# Patient Record
Sex: MALE | Race: WHITE | NOT HISPANIC OR LATINO | Employment: OTHER | URBAN - NONMETROPOLITAN AREA
[De-identification: names, ages, dates, MRNs, and addresses within clinical notes are randomized per-mention and may not be internally consistent; named-entity substitution may affect disease eponyms.]

---

## 2021-04-01 ENCOUNTER — TRANSCRIBE ORDERS (OUTPATIENT)
Dept: ADMINISTRATIVE | Facility: HOSPITAL | Age: 71
End: 2021-04-01

## 2021-04-01 DIAGNOSIS — R41.82 ALTERED MENTAL STATUS, UNSPECIFIED ALTERED MENTAL STATUS TYPE: Primary | ICD-10-CM

## 2021-04-08 ENCOUNTER — TRANSCRIBE ORDERS (OUTPATIENT)
Dept: ADMINISTRATIVE | Facility: HOSPITAL | Age: 71
End: 2021-04-08

## 2021-04-21 ENCOUNTER — HOSPITAL ENCOUNTER (OUTPATIENT)
Dept: CT IMAGING | Facility: HOSPITAL | Age: 71
Discharge: HOME | End: 2021-04-21
Admitting: PHYSICIAN ASSISTANT

## 2021-04-21 DIAGNOSIS — R41.82 ALTERED MENTAL STATUS, UNSPECIFIED ALTERED MENTAL STATUS TYPE: ICD-10-CM

## 2021-04-21 PROCEDURE — 70450 CT HEAD/BRAIN W/O DYE: CPT

## 2021-04-21 PROCEDURE — 70450 CT HEAD/BRAIN W/O DYE: CPT | Performed by: RADIOLOGY

## 2021-05-14 ENCOUNTER — TRANSCRIBE ORDERS (OUTPATIENT)
Dept: ADMINISTRATIVE | Facility: HOSPITAL | Age: 71
End: 2021-05-14

## 2021-05-14 DIAGNOSIS — R10.84 GENERALIZED ABDOMINAL PAIN: Primary | ICD-10-CM

## 2021-05-26 ENCOUNTER — HOSPITAL ENCOUNTER (OUTPATIENT)
Dept: CT IMAGING | Facility: HOSPITAL | Age: 71
Discharge: HOME | End: 2021-05-26
Admitting: PHYSICIAN ASSISTANT

## 2021-05-26 DIAGNOSIS — R10.84 GENERALIZED ABDOMINAL PAIN: ICD-10-CM

## 2021-05-26 PROCEDURE — 74176 CT ABD & PELVIS W/O CONTRAST: CPT

## 2021-05-26 PROCEDURE — 74176 CT ABD & PELVIS W/O CONTRAST: CPT | Performed by: RADIOLOGY

## 2022-02-10 ENCOUNTER — TRANSCRIBE ORDERS (OUTPATIENT)
Dept: ADMINISTRATIVE | Facility: HOSPITAL | Age: 72
End: 2022-02-10

## 2022-02-10 DIAGNOSIS — R07.9 CHEST PAIN, UNSPECIFIED TYPE: Primary | ICD-10-CM

## 2023-11-29 ENCOUNTER — SPECIALTY PHARMACY (OUTPATIENT)
Dept: PHARMACY | Facility: HOSPITAL | Age: 73
End: 2023-11-29
Payer: MEDICARE

## 2023-11-29 ENCOUNTER — OFFICE VISIT (OUTPATIENT)
Dept: ENDOCRINOLOGY | Facility: CLINIC | Age: 73
End: 2023-11-29
Payer: MEDICARE

## 2023-11-29 VITALS
OXYGEN SATURATION: 98 % | BODY MASS INDEX: 36.73 KG/M2 | DIASTOLIC BLOOD PRESSURE: 84 MMHG | HEART RATE: 74 BPM | HEIGHT: 69 IN | SYSTOLIC BLOOD PRESSURE: 165 MMHG | WEIGHT: 248 LBS

## 2023-11-29 DIAGNOSIS — E83.52 HYPERCALCEMIA: ICD-10-CM

## 2023-11-29 DIAGNOSIS — E11.65 TYPE 2 DIABETES MELLITUS WITH HYPERGLYCEMIA, WITH LONG-TERM CURRENT USE OF INSULIN: Primary | ICD-10-CM

## 2023-11-29 DIAGNOSIS — Z79.4 TYPE 2 DIABETES MELLITUS WITH HYPERGLYCEMIA, WITH LONG-TERM CURRENT USE OF INSULIN: Primary | ICD-10-CM

## 2023-11-29 LAB
25(OH)D3 SERPL-MCNC: 18 NG/ML (ref 30–100)
ALBUMIN SERPL-MCNC: 3.8 G/DL (ref 3.5–5.2)
ALBUMIN UR-MCNC: >440 MG/DL
ALBUMIN/GLOB SERPL: 1.3 G/DL
ALP SERPL-CCNC: 77 U/L (ref 39–117)
ALT SERPL W P-5'-P-CCNC: 13 U/L (ref 1–41)
ANION GAP SERPL CALCULATED.3IONS-SCNC: 9.4 MMOL/L (ref 5–15)
AST SERPL-CCNC: 19 U/L (ref 1–40)
BILIRUB SERPL-MCNC: 0.4 MG/DL (ref 0–1.2)
BUN SERPL-MCNC: 20 MG/DL (ref 8–23)
BUN/CREAT SERPL: 14.8 (ref 7–25)
CA-I SERPL ISE-MCNC: 1.23 MMOL/L (ref 1.12–1.32)
CALCIUM SPEC-SCNC: 9 MG/DL (ref 8.6–10.5)
CHLORIDE SERPL-SCNC: 103 MMOL/L (ref 98–107)
CHOLEST SERPL-MCNC: 165 MG/DL (ref 0–200)
CO2 SERPL-SCNC: 27.6 MMOL/L (ref 22–29)
CREAT SERPL-MCNC: 1.35 MG/DL (ref 0.76–1.27)
CREAT UR-MCNC: 78.4 MG/DL
EGFRCR SERPLBLD CKD-EPI 2021: 56.1 ML/MIN/1.73
GLOBULIN UR ELPH-MCNC: 2.9 GM/DL
GLUCOSE SERPL-MCNC: 295 MG/DL (ref 65–99)
HDLC SERPL-MCNC: 53 MG/DL (ref 40–60)
LDLC SERPL CALC-MCNC: 79 MG/DL (ref 0–100)
LDLC/HDLC SERPL: 1.37 {RATIO}
MICROALBUMIN/CREAT UR: NORMAL MG/G{CREAT}
POTASSIUM SERPL-SCNC: 4.1 MMOL/L (ref 3.5–5.2)
PROT SERPL-MCNC: 6.7 G/DL (ref 6–8.5)
PTH-INTACT SERPL-MCNC: 95.2 PG/ML (ref 15–65)
SODIUM SERPL-SCNC: 140 MMOL/L (ref 136–145)
T4 FREE SERPL-MCNC: 1.06 NG/DL (ref 0.93–1.7)
TRIGL SERPL-MCNC: 197 MG/DL (ref 0–150)
TSH SERPL DL<=0.05 MIU/L-ACNC: 3.22 UIU/ML (ref 0.27–4.2)
VLDLC SERPL-MCNC: 33 MG/DL (ref 5–40)

## 2023-11-29 PROCEDURE — 84443 ASSAY THYROID STIM HORMONE: CPT | Performed by: NURSE PRACTITIONER

## 2023-11-29 PROCEDURE — 83970 ASSAY OF PARATHORMONE: CPT | Performed by: NURSE PRACTITIONER

## 2023-11-29 PROCEDURE — 82330 ASSAY OF CALCIUM: CPT | Performed by: NURSE PRACTITIONER

## 2023-11-29 PROCEDURE — 84439 ASSAY OF FREE THYROXINE: CPT | Performed by: NURSE PRACTITIONER

## 2023-11-29 PROCEDURE — 82652 VIT D 1 25-DIHYDROXY: CPT | Performed by: NURSE PRACTITIONER

## 2023-11-29 PROCEDURE — 80061 LIPID PANEL: CPT | Performed by: NURSE PRACTITIONER

## 2023-11-29 PROCEDURE — 82306 VITAMIN D 25 HYDROXY: CPT | Performed by: NURSE PRACTITIONER

## 2023-11-29 PROCEDURE — 80053 COMPREHEN METABOLIC PANEL: CPT | Performed by: NURSE PRACTITIONER

## 2023-11-29 PROCEDURE — 82043 UR ALBUMIN QUANTITATIVE: CPT | Performed by: NURSE PRACTITIONER

## 2023-11-29 PROCEDURE — 82570 ASSAY OF URINE CREATININE: CPT | Performed by: NURSE PRACTITIONER

## 2023-11-29 RX ORDER — LISINOPRIL 20 MG/1
20 TABLET ORAL DAILY
COMMUNITY

## 2023-11-29 RX ORDER — DUTASTERIDE 0.5 MG/1
0.5 CAPSULE, LIQUID FILLED ORAL DAILY
COMMUNITY
Start: 2023-10-12

## 2023-11-29 RX ORDER — FLUTICASONE PROPIONATE 50 MCG
1 SPRAY, SUSPENSION (ML) NASAL DAILY
COMMUNITY
Start: 2023-09-29

## 2023-11-29 RX ORDER — PANTOPRAZOLE SODIUM 40 MG/1
40 TABLET, DELAYED RELEASE ORAL DAILY
COMMUNITY
Start: 2023-09-18

## 2023-11-29 RX ORDER — SITAGLIPTIN 100 MG/1
100 TABLET, FILM COATED ORAL DAILY
COMMUNITY
Start: 2023-10-11

## 2023-11-29 RX ORDER — INSULIN GLARGINE 100 [IU]/ML
5 INJECTION, SOLUTION SUBCUTANEOUS NIGHTLY
COMMUNITY
Start: 2023-11-01

## 2023-11-29 RX ORDER — ROSUVASTATIN CALCIUM 20 MG/1
20 TABLET, COATED ORAL DAILY
COMMUNITY
Start: 2023-09-29

## 2023-11-29 RX ORDER — GLIPIZIDE 10 MG/1
10 TABLET ORAL DAILY
COMMUNITY
Start: 2023-10-12

## 2023-11-29 NOTE — PROGRESS NOTES
Specialty Pharmacy Patient Management Program  Endocrinology Initial Assessment       Frankie Bustillo is a 73 y.o. male with {Endo Disease States:38166} seen by an Endocrinology provider and enrolled in the Endocrinology Patient Management program offered by [facility].  An initial outreach was conducted, including assessment of therapy appropriateness and specialty medication education for ***Medications {endomeds:85948}. The patient was introduced to services offered by [facility], including: regular assessments, refill coordination, curbside pick-up or mail order delivery options, prior authorization maintenance, and financial assistance programs as applicable. The patient was also provided with contact information for the pharmacy team.     Patient is currently taking ***    Patient checks BG *** with readings ***. Patient {Reports or denies:23984} low BG <70.     Patient {Reports or denies:29425} personal/family history of thyroid cancer, {Reports or denies:50741} history of pancreatitis, and {Reports or denies:57795} issues with recurrent UTI/yeast infections.     In the past, the patient has tried:     Drug Dose Reason for Discontinuation Notes                                                                 Insurance Coverage & Financial Support  ***     Relevant Past Medical History and Comorbidities  Relevant medical history and concomitant health conditions were discussed with the patient. The patient's chart has been reviewed for relevant past medical history and comorbid health conditions and updated as necessary.   No past medical history on file.  Social History     Socioeconomic History    Marital status:    Tobacco Use    Smoking status: Never     Passive exposure: Never    Smokeless tobacco: Never   Substance and Sexual Activity    Alcohol use: Never    Drug use: Never    Sexual activity: Defer            Allergies  Known allergies and reactions were  "discussed with the patient. The patient's chart has been reviewed for  allergy information and updated as necessary.   Not on File         Relevant Laboratory Values    No results found for: \"HGBA1C\"  No results found for: \"GLUCOSE\", \"CALCIUM\", \"NA\", \"K\", \"CO2\", \"CL\", \"BUN\", \"CREATININE\", \"EGFRIFAFRI\", \"EGFRIFNONA\", \"BCR\", \"ANIONGAP\"  No results found for: \"CHOL\", \"CHLPL\", \"TRIG\", \"HDL\", \"LDL\", \"LDLDIRECT\"      Current Medication List  This medication list has been reviewed with the patient and evaluated for any interactions or necessary modifications/recommendations, and updated to include all prescription medications, OTC medications, and supplements the patient is currently taking.  This list reflects what is contained in the patient's profile, which has also been marked as reviewed to communicate to other providers it is the most up to date version of the patient's current medication therapy.   No current outpatient medications on file.         Drug Interactions  No significant drug-drug interactions with diabetes medications expected according to literature.  *** Enter Interactions Here or \"None\"     Recommended Medications Assessment  Aspirin -  {reccommendedendomeds:06315}  Statin -  {reccommendedendomeds:99599}  ACEi/ARB - {reccommendedendomeds:62815}    Current 10-Year ASCVD Risk:     Initial Education Provided for Specialty Medication  The patient has been provided with the following education and any applicable administration techniques (i.e. self-injection) have been demonstrated for the therapies indicated. All questions and concerns have been addressed prior to the patient receiving the medication, and the patient has verbalized understanding of the education and any materials provided.  Additional patient education shall be provided and documented upon request by the patient, provider or payer.      *** Enter Specific Medication Dot Phrase Here    Adherence and Self-Administration  Adherence related " to the patient's specialty therapy was discussed with the patient. The Adherence segment of this outreach has been reviewed and updated.              Barriers to Patient Adherence and/or Self-Administration: ***   Methods for Supporting Patient Adherence and/or Self-Administration: ***    Vaccination Status:   COVID 19:   Influenza:   Pneumococcal:   Hep B:    Smoker?  ***    Goals of Therapy  Goals related to the patient's specialty therapy were discussed with the patient. The Patient Goals segment of this outreach has been reviewed and updated.    Goals Addressed Today    None         Reassessment Plan & Follow-Up  Patient's diabetes is {controlled vs. uncontrolled:79849} with A1C of ***%.  Medication Therapy Changes:  ***   Related Plans, Therapy Recommendations or Therapy Problems to Be Addressed: ***   Recommended appropriate vaccinations to patient - ***accepts/declines today   Patient ***reports/denies issues with affordability or adherence at this time.       Patient does not wish to use [facility] at this time due to:     Attestation  I attest the patient was actively involved in and has agreed to the above plan of care. If the prescribed therapy is at any point deemed not appropriate based on the current or future assessments, a consultation will be initiated with the patient's specialty care provider to determine the best course of action. The revised plan of therapy will be documented along with any required assessments and/or additional patient education provided..    Kym Lyle, PharmD, PATRICIA KENDALL  Clinical Specialty Pharmacist, Endocrinology  11/29/2023  10:21 EST

## 2023-11-29 NOTE — ASSESSMENT & PLAN NOTE
Discussed various underlying causes of hypercalcemia with patient and family.  Will obtain labs to determine underlying cause to determine treatment plan.  Follow-up in 3 months.

## 2023-11-29 NOTE — PROGRESS NOTES
Pt was referred for management of type 2 diabetes and hyperparathyroidism. He reports only wanting to be seen for his thyroid at this time. Reviewed medications and updated patient's medication list and drug allergies. No diabetes assessment was performed today.     Please disregard specialty pharmacy diabetes encounter.     Thank you,     Kym Lyle, PharmD, PATRICIA KENDALL  Clinical Specialty Pharmacist, Endocrinology  11/29/23  10:33 EST

## 2023-11-29 NOTE — PROGRESS NOTES
,  Chief Complaint   Patient presents with    Diabetes        Referring Provider  Bay William PA-C HPI   Frankie Bustillo is a 73 y.o. male had concerns including Diabetes.   Seen as a new patient.  T2DM. Hypercalcemia.     Diabetes was diagnosed 2013, through abnormal labs.  Complications include nephropathy.  Last ophtho exam was none.  Current medications for diabetes include Lantus 5 units QHS, Glipizide 10 mg QD, Januvia 100 mg QD.  He checks his blood sugar 1-2 times per day.   Hypos: none     ACE/ARB:yes, Statin: yes  Labs:  A1C: 9.1 (10/2023)  Lipid Panel: due  CINDY: due    The following portions of the patient's history were reviewed and updated as appropriate: allergies, current medications, past family history, past medical history, past social history, past surgical history, and problem list.    Diet: Tries to limit his carbs and sugars.    Hyperparathyroidism:  Patient reports that diagnosis of hyperparathyroidism was made 2023 with abnormal elevated calcium on labs.  Patient does not take calcium supplements or OTC medications containing calcium, such as TUMS.   Patient does take vitamin D supplementation.    Patient has no history of hypertension treated with HCTZ or chlorthalidone.  Patient has history of kidney dysfunction.  Patient has no history of kidney stones.  Patient has no history of osteopenia or osteoporosis.  Patient has no family history of calcium disorders.  Patient reports 1-2 servings per day of dairy.     His PCP wanted him to see nephrology but has not seen them due to hesitancy.    History reviewed. No pertinent past medical history.  History reviewed. No pertinent surgical history.   History reviewed. No pertinent family history.   Social History     Socioeconomic History    Marital status:    Tobacco Use    Smoking status: Never     Passive exposure: Never    Smokeless tobacco: Never   Substance and Sexual Activity    Alcohol use: Never    Drug use: Never    Sexual  "activity: Defer      Allergies   Allergen Reactions    Sulfa Antibiotics Other (See Comments)     \"Burning under my arms\"      Current Outpatient Medications on File Prior to Visit   Medication Sig Dispense Refill    dutasteride (AVODART) 0.5 MG capsule Take 1 capsule by mouth Daily.      Ferrous Sulfate (IRON PO) Take 1 tablet by mouth Daily.      fluticasone (FLONASE) 50 MCG/ACT nasal spray 1 spray into the nostril(s) as directed by provider Daily.      glipizide (GLUCOTROL) 10 MG tablet Take 1 tablet by mouth Daily.      Januvia 100 MG tablet Take 1 tablet by mouth Daily.      Lantus SoloStar 100 UNIT/ML injection pen Inject 5 Units under the skin into the appropriate area as directed Every Night.      lisinopril (PRINIVIL,ZESTRIL) 20 MG tablet Take 1 tablet by mouth Daily.      pantoprazole (PROTONIX) 40 MG EC tablet Take 1 tablet by mouth Daily.      rosuvastatin (CRESTOR) 20 MG tablet Take 1 tablet by mouth Daily.       No current facility-administered medications on file prior to visit.        Review of Systems   Constitutional:  Positive for fatigue. Negative for unexpected weight gain and unexpected weight loss.   Eyes: Negative.    Gastrointestinal:  Positive for nausea.   Endocrine: Positive for polydipsia. Negative for polyphagia and polyuria.   Neurological:  Positive for dizziness, light-headedness and memory problem. Negative for seizures, syncope, weakness, headache and confusion.   Psychiatric/Behavioral:  Negative for sleep disturbance.    All other systems reviewed and are negative.    /84 (BP Location: Left arm, Patient Position: Sitting, Cuff Size: Adult)   Pulse 74   Ht 175.3 cm (69\")   Wt 112 kg (248 lb)   SpO2 98%   BMI 36.62 kg/m²      Physical Exam  Vitals reviewed.   Constitutional:       Appearance: Normal appearance.   Eyes:      Extraocular Movements: Extraocular movements intact.   Neck:      Thyroid: Thyromegaly and thyroid tenderness present. No thyroid mass. " "  Cardiovascular:      Rate and Rhythm: Normal rate.   Pulmonary:      Effort: Pulmonary effort is normal.   Skin:     General: Skin is warm.   Neurological:      General: No focal deficit present.      Mental Status: He is alert and oriented to person, place, and time.   Psychiatric:         Mood and Affect: Mood normal.         Behavior: Behavior normal.         Thought Content: Thought content normal.         Judgment: Judgment normal.       CMP:  Lab Results   Component Value Date    BUN 20 11/29/2023    CREATININE 1.35 (H) 11/29/2023    BCR 14.8 11/29/2023     11/29/2023    K 4.1 11/29/2023    CO2 27.6 11/29/2023    CALCIUM 9.0 11/29/2023    ALBUMIN 3.8 11/29/2023    BILITOT 0.4 11/29/2023    ALKPHOS 77 11/29/2023    AST 19 11/29/2023    ALT 13 11/29/2023     Lipid Panel:  Lab Results   Component Value Date    CHOL 165 11/29/2023    TRIG 197 (H) 11/29/2023    HDL 53 11/29/2023    VLDL 33 11/29/2023    LDL 79 11/29/2023     HbA1c:     Glucose:  No results found for: \"POCGLU\"  Microalbumin:  No results found for: \"MALBCRERATIO\"  TSH:  Lab Results   Component Value Date    TSH 3.220 11/29/2023       Assessment and Plan    Diagnoses and all orders for this visit:    1. Type 2 diabetes mellitus with hyperglycemia, with long-term current use of insulin (Primary)  Assessment & Plan:  -Diabetes is above goal with A1c 9.1.  -Discussed dietary and exercise guidelines with patient and family.  -Discussed the importance of yearly eye exams.  -Discussed the importance of checking BG's regularly.    -Continue Glipizide 10 mg QD.  -Continue Januvia 100 mg QD.  -Increase Lantus 15 units QHS.  -Discussed that when his labs result, if his kidney function is abnormally low, will need to have further medication adjustments.  -Further discussion with patient and family pertaining to abnormal kidney function tests.  He is willing to see nephrology at this time.  Will send referral for this.  -S/S hypoglycemia reviewed with " Rule of 15's advised.  -Follow-up in 3 months.      Orders:  -     Comprehensive Metabolic Panel  -     Lipid Panel  -     TSH  -     T4, free  -     Microalbumin / Creatinine Urine Ratio - Urine, Clean Catch  -     Ambulatory Referral to Nephrology    2. Hypercalcemia  Assessment & Plan:  Discussed various underlying causes of hypercalcemia with patient and family.  Will obtain labs to determine underlying cause to determine treatment plan.  Follow-up in 3 months.    Orders:  -     Calcium, Urine, 24 Hour - Urine, Clean Catch  -     PTH, Intact  -     Comprehensive Metabolic Panel  -     Vitamin D,25-Hydroxy  -     Vitamin D 1,25 Dihydroxy  -     Calcium, Ionized         Return in about 3 months (around 2/29/2024) for Follow-up appointment, A1C. The patient was instructed to contact the clinic with any interval questions or concerns.        This document has been electronically signed by DONNA Pak  November 29, 2023 15:49 EST   Endocrinology    Please note that portions of this document were completed with a voice recognition program. Efforts were made to edit the dictations, but occasionally words are mis-transcribed.

## 2023-11-29 NOTE — ASSESSMENT & PLAN NOTE
-Diabetes is above goal with A1c 9.1.  -Discussed dietary and exercise guidelines with patient and family.  -Discussed the importance of yearly eye exams.  -Discussed the importance of checking BG's regularly.    -Continue Glipizide 10 mg QD.  -Continue Januvia 100 mg QD.  -Increase Lantus 15 units QHS.  -Discussed that when his labs result, if his kidney function is abnormally low, will need to have further medication adjustments.  -Further discussion with patient and family pertaining to abnormal kidney function tests.  He is willing to see nephrology at this time.  Will send referral for this.  -S/S hypoglycemia reviewed with Rule of 15's advised.  -Follow-up in 3 months.

## 2023-12-01 LAB — 1,25(OH)2D SERPL-MCNC: 37.4 PG/ML (ref 24.8–81.5)

## 2023-12-27 ENCOUNTER — TELEPHONE (OUTPATIENT)
Dept: ENDOCRINOLOGY | Facility: CLINIC | Age: 73
End: 2023-12-27
Payer: MEDICARE

## 2023-12-27 RX ORDER — ERGOCALCIFEROL 1.25 MG/1
50000 CAPSULE ORAL WEEKLY
Qty: 5 CAPSULE | Refills: 4 | Status: SHIPPED | OUTPATIENT
Start: 2023-12-27

## 2023-12-27 NOTE — TELEPHONE ENCOUNTER
PT CALLED STATING HE RECEIVED A LETTER FROM [facility] IN REGARDS TO HIS LABS FROM HIS INITIAL VISIT. PT STATED HE WAS AGREEABLE TO STARTING VIT-D RX BEING SENT IN. HE REQUESTED THE RX TO BE SENT IN TO [facility] IN [place], [place].

## 2024-09-19 RX ORDER — CHOLECALCIFEROL (VITAMIN D3) 1250 MCG
CAPSULE ORAL
Qty: 5 CAPSULE | Refills: 2 | Status: SHIPPED | OUTPATIENT
Start: 2024-09-19

## 2025-01-23 RX ORDER — CHOLECALCIFEROL (VITAMIN D3) 1250 MCG
50000 CAPSULE ORAL
Qty: 5 CAPSULE | Refills: 5 | Status: SHIPPED | OUTPATIENT
Start: 2025-01-23